# Patient Record
Sex: MALE | Race: WHITE | Employment: PART TIME | URBAN - NONMETROPOLITAN AREA
[De-identification: names, ages, dates, MRNs, and addresses within clinical notes are randomized per-mention and may not be internally consistent; named-entity substitution may affect disease eponyms.]

---
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
## 2019-07-08 NOTE — PROGRESS NOTES
HPI:    Patient ID: Estefania De La Rosa is a 22year old male. Patient presents with: Anxiety: Pt will like to discuss anxiety episodes.    Patient is here with his mother  He just completed his freshman year at [employer], he is running track  Patient stat regular rhythm and normal heart sounds. No murmur heard. Pulmonary/Chest: Effort normal and breath sounds normal.   Psychiatric: His speech is normal and behavior is normal. Judgment and thought content normal. His mood appears anxious.  Thought content

## 2019-07-08 NOTE — PATIENT INSTRUCTIONS
Total of 25 minutes was spent with patient and his mother discussing signs and symptoms of depression and anxiety.   We discussed conservative management including individual psychotherapy with cognitive behavioral therapy, relaxation breathing, prayer, nina

## 2019-08-13 ENCOUNTER — OFFICE VISIT (OUTPATIENT)
Dept: FAMILY MEDICINE CLINIC | Facility: CLINIC | Age: 19
End: 2019-08-13
Payer: COMMERCIAL

## 2019-08-13 VITALS
BODY MASS INDEX: 21.68 KG/M2 | TEMPERATURE: 98 F | HEART RATE: 64 BPM | WEIGHT: 174.38 LBS | RESPIRATION RATE: 12 BRPM | HEIGHT: 75 IN | DIASTOLIC BLOOD PRESSURE: 70 MMHG | SYSTOLIC BLOOD PRESSURE: 114 MMHG

## 2019-08-13 DIAGNOSIS — Z23 NEED FOR VACCINATION: ICD-10-CM

## 2019-08-13 DIAGNOSIS — F41.8 DEPRESSION WITH ANXIETY: ICD-10-CM

## 2019-08-13 DIAGNOSIS — Z00.00 PREVENTATIVE HEALTH CARE: Primary | ICD-10-CM

## 2019-08-13 PROCEDURE — 90471 IMMUNIZATION ADMIN: CPT | Performed by: FAMILY MEDICINE

## 2019-08-13 PROCEDURE — 90651 9VHPV VACCINE 2/3 DOSE IM: CPT | Performed by: FAMILY MEDICINE

## 2019-08-13 PROCEDURE — 99395 PREV VISIT EST AGE 18-39: CPT | Performed by: FAMILY MEDICINE

## 2019-08-13 NOTE — PATIENT INSTRUCTIONS
I reviewed age-appropriate immunizations. HPV #2 given, follow-up in 4 months for vaccine #3  Discussed safe sex practices including routine use of condoms. Continue escitalopram 10 mg through spring 2020.   Patient was congratulated on his coverage for a

## 2019-08-13 NOTE — H&P
Sharon Hernandez is a 22year old male who presents for a complete physical exam.   HPI:   Patient was seen a month ago and started on Lexapro 10 mg daily for increasing anxiety with depressive episodes. He decided to transfer from [address] [place] [address] to [facility].   He appetite  SKIN: denies any unusual skin lesions or rashes  EYES:denies blurred vision or double vision  ENT: denies nasal congestion, PND or ST, denies snoring and reported periods of apnea, denies hearing deficits  LUNGS: denies shortness of breath with e nerves are intact,motor and sensory are grossly intact, DTRs +2/4 UE/LE bilaterally  PSYCH: affect: slightly anxious, speech: clear and coherent, Insight: appropriate  ASSESSMENT AND PLAN:   Dasia Stallworth is a 22year old male   [facility]  (p

## 2019-09-03 RX ORDER — ESCITALOPRAM OXALATE 10 MG/1
TABLET ORAL
Qty: 90 TABLET | Refills: 1 | Status: SHIPPED | OUTPATIENT
Start: 2019-09-03 | End: 2020-03-17

## 2020-03-17 RX ORDER — ESCITALOPRAM OXALATE 10 MG/1
TABLET ORAL
Qty: 30 TABLET | Refills: 0 | Status: SHIPPED | OUTPATIENT
Start: 2020-03-17 | End: 2020-06-29

## 2020-06-27 NOTE — TELEPHONE ENCOUNTER
Last refill: 03/17/20  Qty: 30  W/ 0 refills  Last ov: 08/13/20    Requested Prescriptions     Pending Prescriptions Disp Refills   • ESCITALOPRAM 10 MG Oral Tab [Pharmacy Med Name: ESCITALOPRAM 10 MG TABLET] 90 tablet 0     Sig: TAKE 1 TABLET BY MOUTH KHUSHI

## 2020-06-29 RX ORDER — ESCITALOPRAM OXALATE 10 MG/1
TABLET ORAL
Qty: 90 TABLET | Refills: 0 | Status: SHIPPED | OUTPATIENT
Start: 2020-06-29

## 2020-06-29 NOTE — TELEPHONE ENCOUNTER
Future Appointments   Date Time Provider Yousif Eason   8/14/2020  8:00 AM Cal Freeman., DO [facility]     PHYSICAL SCHEDULED

## 2022-04-06 ENCOUNTER — OFFICE VISIT (OUTPATIENT)
Dept: FAMILY MEDICINE CLINIC | Facility: CLINIC | Age: 22
End: 2022-04-06
Payer: COMMERCIAL

## 2022-04-06 VITALS
HEART RATE: 79 BPM | BODY MASS INDEX: 23.56 KG/M2 | WEIGHT: 189.5 LBS | HEIGHT: 75 IN | OXYGEN SATURATION: 98 % | TEMPERATURE: 100 F

## 2022-04-06 DIAGNOSIS — J01.10 ACUTE NON-RECURRENT FRONTAL SINUSITIS: Primary | ICD-10-CM

## 2022-04-06 PROCEDURE — 3008F BODY MASS INDEX DOCD: CPT | Performed by: FAMILY MEDICINE

## 2022-04-06 PROCEDURE — 99213 OFFICE O/P EST LOW 20 MIN: CPT | Performed by: FAMILY MEDICINE

## 2022-04-06 RX ORDER — AMOXICILLIN 875 MG/1
875 TABLET, COATED ORAL 2 TIMES DAILY
Qty: 20 TABLET | Refills: 0 | Status: SHIPPED | OUTPATIENT
Start: 2022-04-06

## 2025-04-27 ENCOUNTER — PATIENT OUTREACH (OUTPATIENT)
Dept: CASE MANAGEMENT | Age: 25
End: 2025-04-27

## 2025-04-27 NOTE — PROCEDURES
The office order for PCP removal request is Approved and finalized on April 27, 2025.    Removed Broderick Vincent DO as the patient's Primary Care Physician

## (undated) NOTE — LETTER
04/06/22          To Whom It May Concern,    Due to medical illness, please excuse Soumya Vega from work today.     Sincerely,      Jaquelin Julian D.O., FAAFP